# Patient Record
Sex: FEMALE | URBAN - METROPOLITAN AREA
[De-identification: names, ages, dates, MRNs, and addresses within clinical notes are randomized per-mention and may not be internally consistent; named-entity substitution may affect disease eponyms.]

---

## 2022-10-28 ENCOUNTER — TELEPHONE (OUTPATIENT)
Dept: PULMONOLOGY | Age: 57
End: 2022-10-28

## 2022-10-28 NOTE — TELEPHONE ENCOUNTER
Thee Scott from [facility], called to schedule patient for consult. I let her know it appeared she did not have a chart with [facility] yet and I was creating one for her, but we must have insurance information for appt, whether someone calls and provides it over the phone or patient brings card to appt. Thee Scott stated she just scheduled appointments, she did not have any personal or medical information on patients. She stated her manager was in a meeting but could call us back, I said that was fine as long as we get more information before appt. Phone number on file is Thee Scott from [facility], who gave verbal permission to add her phone number to patient's chart.